# Patient Record
Sex: FEMALE
[De-identification: names, ages, dates, MRNs, and addresses within clinical notes are randomized per-mention and may not be internally consistent; named-entity substitution may affect disease eponyms.]

---

## 2017-08-22 ENCOUNTER — IMPORTED ENCOUNTER (OUTPATIENT)
Age: 82
End: 2017-08-22

## 2017-09-05 ENCOUNTER — IMPORTED ENCOUNTER (OUTPATIENT)
Age: 82
End: 2017-09-05

## 2017-09-18 ENCOUNTER — IMPORTED ENCOUNTER (OUTPATIENT)
Dept: URBAN - METROPOLITAN AREA CLINIC 9 | Facility: CLINIC | Age: 82
End: 2017-09-18

## 2017-09-18 ENCOUNTER — IMPORTED ENCOUNTER (OUTPATIENT)
Age: 82
End: 2017-09-18

## 2017-10-19 ENCOUNTER — IMPORTED ENCOUNTER (OUTPATIENT)
Dept: URBAN - METROPOLITAN AREA CLINIC 9 | Facility: CLINIC | Age: 82
End: 2017-10-19

## 2017-10-19 ENCOUNTER — IMPORTED ENCOUNTER (OUTPATIENT)
Age: 82
End: 2017-10-19

## 2017-10-30 NOTE — PATIENT DISCUSSION
(H10.022) Other mucopurulent conjunctivitis, left eye - Assesment : Examination revealed other mucopurulent conjunctivitis. - Plan : Start Besivance bid OS for 1 week. E-Rx sent to pharmacy. Advised Pt that gtt is milky and can cause some blurred vision so caution should be used. Okay to use in OD if symptoms start there. Call if symptoms worsen, do not improve, or new symptoms or vision changes occur. RTC in 1-2 months for Exam and OCT ONH, sooner if problems or changes.

## 2017-12-05 NOTE — PATIENT DISCUSSION
(H40.013) Open angle with borderline findings, low risk, bilateral - Assesment : Examination revealed suspicion for Open Angle Glaucoma. OCT ONH and IOP stable today. - Plan : Monitor for IOP and NFL changes with visits and testing. RTC in 1 year for Exam and OCT ONH, sooner if problems or changes occur.

## 2017-12-05 NOTE — PATIENT DISCUSSION
(I51.488) Keratoconjunct sicca, not specified as Sjogren's, bilateral - Assesment : Examination revealed Dry Eye Syndrome - Plan : Monitor for changes. ATs recommended daily and prn and hot compresses qam. Refresh coupon given.

## 2018-04-17 NOTE — PATIENT DISCUSSION
(Q88.301) Keratoconjunct sicca, not specified as Sjogren's, bilateral - Assesment : Examination revealed Dry Eye Syndrome - Plan : Monitor for changes. Increase ATs to at least 3-4 times daily and prn. Updated GLRx given today. PT to call with vision changes or problems. RTC as scheduled in December for Exam and JOLY[facility] Vigil 74, sooner if problems.

## 2019-03-01 ENCOUNTER — IMPORTED ENCOUNTER (OUTPATIENT)
Age: 84
End: 2019-03-01

## 2019-03-26 ENCOUNTER — IMPORTED ENCOUNTER (OUTPATIENT)
Dept: URBAN - METROPOLITAN AREA CLINIC 9 | Facility: CLINIC | Age: 84
End: 2019-03-26

## 2019-03-26 ENCOUNTER — IMPORTED ENCOUNTER (OUTPATIENT)
Age: 84
End: 2019-03-26

## 2019-10-28 ENCOUNTER — IMPORTED ENCOUNTER (OUTPATIENT)
Age: 84
End: 2019-10-28

## 2021-01-20 ENCOUNTER — IMPORTED ENCOUNTER (OUTPATIENT)
Dept: URBAN - METROPOLITAN AREA CLINIC 9 | Facility: CLINIC | Age: 86
End: 2021-01-20

## 2021-01-20 ENCOUNTER — IMPORTED ENCOUNTER (OUTPATIENT)
Age: 86
End: 2021-01-20

## 2021-04-21 ENCOUNTER — IMPORTED ENCOUNTER (OUTPATIENT)
Age: 86
End: 2021-04-21

## 2021-04-21 ENCOUNTER — IMPORTED ENCOUNTER (OUTPATIENT)
Dept: URBAN - METROPOLITAN AREA CLINIC 9 | Facility: CLINIC | Age: 86
End: 2021-04-21

## 2021-04-21 PROBLEM — H26.491: Noted: 2021-04-21

## 2021-04-21 PROBLEM — H01.021: Noted: 2021-04-21

## 2021-04-21 PROBLEM — INACTIVE: Noted: 2021-04-21

## 2021-04-21 PROBLEM — H01.024: Noted: 2021-04-21

## 2021-04-21 PROBLEM — H04.123: Noted: 2021-04-21

## 2021-04-21 PROBLEM — H40.013: Noted: 2021-04-21

## 2021-10-02 ASSESSMENT — TONOMETRY
OD_IOP_MMHG: 12
OD_IOP_MMHG: 14
OS_IOP_MMHG: 15
OS_IOP_MMHG: 20
OD_IOP_MMHG: 14
OD_IOP_MMHG: 16
OS_IOP_MMHG: 15
OS_IOP_MMHG: 16
OD_IOP_MMHG: 16
OS_IOP_MMHG: 25

## 2021-10-02 ASSESSMENT — VISUAL ACUITY
OD_CC: 20/20 SN
OS_CC: 20/20 - SN
OS_CC: 20/20 - SN
OS_SC: 20/30 - SN
OS_CC: 20/20 - SN
OS_SC: 20/30 -2 SN
OD_SC: 20/25 -2 SN
OD_CC: 20/20 SN
OD_SC: 20/30 -2 SN
OD_CC: 20/20 SN

## 2021-10-02 ASSESSMENT — KERATOMETRY
OD_AXISANGLE_DEGREES: 135
OD_K2POWER_DIOPTERS: 42
OD_AXISANGLE2_DEGREES: 45
OS_K2POWER_DIOPTERS: 42.25
OD_K1POWER_DIOPTERS: 41.5
OS_K1POWER_DIOPTERS: 42
OS_AXISANGLE_DEGREES: 114
OS_AXISANGLE2_DEGREES: 24

## 2021-11-02 ENCOUNTER — PREPPED CHART (OUTPATIENT)
Dept: URBAN - METROPOLITAN AREA CLINIC 17 | Facility: CLINIC | Age: 86
End: 2021-11-02

## 2021-11-02 ENCOUNTER — ESTABLISHED PATIENT (OUTPATIENT)
Dept: URBAN - METROPOLITAN AREA CLINIC 17 | Facility: CLINIC | Age: 86
End: 2021-11-02

## 2021-11-02 DIAGNOSIS — H04.123: ICD-10-CM

## 2021-11-02 DIAGNOSIS — H40.013: ICD-10-CM

## 2021-11-02 PROCEDURE — 99213 OFFICE O/P EST LOW 20 MIN: CPT

## 2021-11-02 ASSESSMENT — TONOMETRY
OS_IOP_MMHG: 14
OD_IOP_MMHG: 14

## 2021-11-02 ASSESSMENT — VISUAL ACUITY
OD_CC: 20/20-1
OS_CC: 20/25-2

## 2021-11-12 ASSESSMENT — VISUAL ACUITY
OS_CC: 20/20 - SN
OD_CC: 20/20 - SN
OS_CC: 20/30 + SN
OD_CC: 20/20 - SN
OS_SC: 20/30 -2 SN
OS_CC: 20/20 SN
OD_CC: 20/20 - SN
OD_CC: 20/20 - SN
OS_CC: 20/20 - SN
OD_CC: 20/20 SN
OS_CC: 20/20 - SN
OD_CC: 20/20 - SN
OS_CC: 20/20 - SN
OD_CC: 20/20 - SN
OD_CC: 20/20 -2 SN
OS_CC: 20/40 - SN
OS_SC: 20/30 - SN
OD_CC: 20/20 SN
OD_SC: 20/25 -2 SN
OS_CC: 20/20 - SN
OS_CC: 20/20 - SN
OD_SC: 20/30 -2 SN
OD_CC: 20/20 SN
OD_CC: 20/20 SN
OD_CC: 20/30 - SN
OS_CC: 20/20 - SN

## 2021-11-12 ASSESSMENT — TONOMETRY
OD_IOP_MMHG: 14
OS_IOP_MMHG: 16
OS_IOP_MMHG: 20
OS_IOP_MMHG: 25
OS_IOP_MMHG: 15
OD_IOP_MMHG: 16
OD_IOP_MMHG: 19
OD_IOP_MMHG: 14
OS_IOP_MMHG: 15
OD_IOP_MMHG: 16
OD_IOP_MMHG: 12
OD_IOP_MMHG: 15
OS_IOP_MMHG: 15
OD_IOP_MMHG: 16
OS_IOP_MMHG: 20
OS_IOP_MMHG: 16
OS_IOP_MMHG: 17
OS_IOP_MMHG: 16
OD_IOP_MMHG: 14
OD_IOP_MMHG: 14

## 2021-11-12 ASSESSMENT — KERATOMETRY
OD_K2POWER_DIOPTERS: 42
OD_K1POWER_DIOPTERS: 41.5
OD_AXISANGLE2_DEGREES: 45
OD_AXISANGLE_DEGREES: 135
OS_AXISANGLE_DEGREES: 114
OS_K2POWER_DIOPTERS: 42.25
OS_K1POWER_DIOPTERS: 42
OS_AXISANGLE2_DEGREES: 24

## 2022-01-05 ENCOUNTER — ESTABLISHED PATIENT (OUTPATIENT)
Dept: URBAN - METROPOLITAN AREA CLINIC 17 | Facility: CLINIC | Age: 87
End: 2022-01-05

## 2022-01-05 DIAGNOSIS — H40.013: ICD-10-CM

## 2022-01-05 DIAGNOSIS — H01.021: ICD-10-CM

## 2022-01-05 DIAGNOSIS — H26.491: ICD-10-CM

## 2022-01-05 DIAGNOSIS — H35.3130: ICD-10-CM

## 2022-01-05 DIAGNOSIS — H01.024: ICD-10-CM

## 2022-01-05 DIAGNOSIS — H04.123: ICD-10-CM

## 2022-01-05 PROCEDURE — 92133 CPTRZD OPH DX IMG PST SGM ON: CPT

## 2022-01-05 PROCEDURE — 92014 COMPRE OPH EXAM EST PT 1/>: CPT

## 2022-01-05 ASSESSMENT — TONOMETRY
OS_IOP_MMHG: 16
OD_IOP_MMHG: 12

## 2022-01-05 ASSESSMENT — VISUAL ACUITY
OD_CC: 20/20-2
OS_CC: 20/20-2

## 2023-01-18 ENCOUNTER — ESTABLISHED PATIENT (OUTPATIENT)
Dept: URBAN - METROPOLITAN AREA CLINIC 17 | Facility: CLINIC | Age: 88
End: 2023-01-18

## 2023-01-18 DIAGNOSIS — H04.123: ICD-10-CM

## 2023-01-18 DIAGNOSIS — H35.3130: ICD-10-CM

## 2023-01-18 DIAGNOSIS — H40.053: ICD-10-CM

## 2023-01-18 DIAGNOSIS — H26.491: ICD-10-CM

## 2023-01-18 PROCEDURE — 99214 OFFICE O/P EST MOD 30 MIN: CPT

## 2023-01-18 PROCEDURE — 92020 GONIOSCOPY: CPT

## 2023-01-18 PROCEDURE — 92133 CPTRZD OPH DX IMG PST SGM ON: CPT

## 2023-01-18 ASSESSMENT — TONOMETRY
OD_IOP_MMHG: 17
OS_IOP_MMHG: 28
OS_IOP_MMHG: 31

## 2023-01-18 ASSESSMENT — VISUAL ACUITY
OS_CC: 20/20-1
OD_CC: 20/20-2

## 2023-04-11 ENCOUNTER — CLINIC PROCEDURE ONLY (OUTPATIENT)
Dept: URBAN - METROPOLITAN AREA CLINIC 17 | Facility: CLINIC | Age: 88
End: 2023-04-11

## 2023-04-11 DIAGNOSIS — H40.053: ICD-10-CM

## 2023-04-11 PROCEDURE — 65855 TRABECULOPLASTY LASER SURG: CPT

## 2023-04-11 ASSESSMENT — TONOMETRY
OD_IOP_MMHG: 12
OS_IOP_MMHG: 14
OS_IOP_MMHG: 12
OD_IOP_MMHG: 14

## 2023-04-11 ASSESSMENT — VISUAL ACUITY
OD_CC: 20/20
OS_CC: 20/20

## 2023-04-12 ENCOUNTER — POST-OP (OUTPATIENT)
Dept: URBAN - METROPOLITAN AREA CLINIC 17 | Facility: CLINIC | Age: 88
End: 2023-04-12

## 2023-04-12 DIAGNOSIS — H40.053: ICD-10-CM

## 2023-04-12 PROCEDURE — 99024 POSTOP FOLLOW-UP VISIT: CPT

## 2023-04-12 ASSESSMENT — TONOMETRY
OD_IOP_MMHG: 15
OS_IOP_MMHG: 16

## 2023-04-12 ASSESSMENT — VISUAL ACUITY
OU_CC: 20/60
OS_CC: 20/60-1
OD_CC: 20/60-1

## 2023-04-14 ENCOUNTER — POST-OP (OUTPATIENT)
Dept: URBAN - METROPOLITAN AREA CLINIC 17 | Facility: CLINIC | Age: 88
End: 2023-04-14

## 2023-04-14 DIAGNOSIS — S05.02XD: ICD-10-CM

## 2023-04-14 DIAGNOSIS — S05.01XD: ICD-10-CM

## 2023-04-14 DIAGNOSIS — H40.053: ICD-10-CM

## 2023-04-14 PROCEDURE — 99213 OFFICE O/P EST LOW 20 MIN: CPT

## 2023-04-14 ASSESSMENT — VISUAL ACUITY
OD_CC: 20/40+1
OU_CC: 20/30
OS_CC: 20/40+1

## 2023-04-14 ASSESSMENT — TONOMETRY
OD_IOP_MMHG: 12
OS_IOP_MMHG: 12

## 2023-04-18 ENCOUNTER — EMERGENCY VISIT (OUTPATIENT)
Dept: URBAN - METROPOLITAN AREA CLINIC 17 | Facility: CLINIC | Age: 88
End: 2023-04-18

## 2023-04-18 DIAGNOSIS — H57.13: ICD-10-CM

## 2023-04-18 DIAGNOSIS — H40.053: ICD-10-CM

## 2023-04-18 PROCEDURE — 99213 OFFICE O/P EST LOW 20 MIN: CPT

## 2023-04-18 ASSESSMENT — VISUAL ACUITY
OS_CC: 20/40+1
OD_CC: 20/40+1

## 2023-04-18 ASSESSMENT — TONOMETRY
OD_IOP_MMHG: 11
OS_IOP_MMHG: 10

## 2023-04-25 ENCOUNTER — FOLLOW UP (OUTPATIENT)
Dept: URBAN - METROPOLITAN AREA CLINIC 17 | Facility: CLINIC | Age: 88
End: 2023-04-25

## 2023-04-25 DIAGNOSIS — H16.143: ICD-10-CM

## 2023-04-25 DIAGNOSIS — H04.123: ICD-10-CM

## 2023-04-25 DIAGNOSIS — H01.024: ICD-10-CM

## 2023-04-25 DIAGNOSIS — H57.13: ICD-10-CM

## 2023-04-25 DIAGNOSIS — H40.053: ICD-10-CM

## 2023-04-25 DIAGNOSIS — H01.021: ICD-10-CM

## 2023-04-25 PROCEDURE — 99213 OFFICE O/P EST LOW 20 MIN: CPT

## 2023-04-25 ASSESSMENT — VISUAL ACUITY
OU_CC: J5
OS_SC: 20/30+2
OD_SC: 20/60

## 2023-04-25 ASSESSMENT — TONOMETRY
OD_IOP_MMHG: 16
OS_IOP_MMHG: 19

## 2023-05-09 ENCOUNTER — FOLLOW UP (OUTPATIENT)
Dept: URBAN - METROPOLITAN AREA CLINIC 17 | Facility: CLINIC | Age: 88
End: 2023-05-09

## 2023-05-09 DIAGNOSIS — H01.021: ICD-10-CM

## 2023-05-09 DIAGNOSIS — H40.053: ICD-10-CM

## 2023-05-09 DIAGNOSIS — H01.024: ICD-10-CM

## 2023-05-09 PROCEDURE — 99213 OFFICE O/P EST LOW 20 MIN: CPT

## 2023-05-09 ASSESSMENT — VISUAL ACUITY
OD_CC: 20/40
OU_CC: 20/25-2
OS_CC: 20/25-1

## 2023-05-09 ASSESSMENT — TONOMETRY
OS_IOP_MMHG: 18
OD_IOP_MMHG: 16

## 2023-06-12 ENCOUNTER — FOLLOW UP (OUTPATIENT)
Dept: URBAN - METROPOLITAN AREA CLINIC 17 | Facility: CLINIC | Age: 88
End: 2023-06-12

## 2023-06-12 DIAGNOSIS — H40.053: ICD-10-CM

## 2023-06-12 DIAGNOSIS — H26.491: ICD-10-CM

## 2023-06-12 DIAGNOSIS — H01.021: ICD-10-CM

## 2023-06-12 DIAGNOSIS — H01.024: ICD-10-CM

## 2023-06-12 PROCEDURE — 99213 OFFICE O/P EST LOW 20 MIN: CPT

## 2023-06-12 ASSESSMENT — VISUAL ACUITY
OS_CC: 20/30-1
OD_CC: 20/25-2

## 2023-06-12 ASSESSMENT — TONOMETRY
OD_IOP_MMHG: 20
OS_IOP_MMHG: 18

## 2023-08-28 ENCOUNTER — EMERGENCY VISIT (OUTPATIENT)
Dept: URBAN - METROPOLITAN AREA CLINIC 17 | Facility: CLINIC | Age: 88
End: 2023-08-28

## 2023-08-28 DIAGNOSIS — H01.024: ICD-10-CM

## 2023-08-28 DIAGNOSIS — H01.021: ICD-10-CM

## 2023-08-28 DIAGNOSIS — H40.053: ICD-10-CM

## 2023-08-28 DIAGNOSIS — H26.491: ICD-10-CM

## 2023-08-28 PROCEDURE — 92012 INTRM OPH EXAM EST PATIENT: CPT

## 2023-08-28 ASSESSMENT — VISUAL ACUITY
OS_CC: 20/30
OD_CC: 20/25

## 2023-08-28 ASSESSMENT — TONOMETRY
OD_IOP_MMHG: 16
OS_IOP_MMHG: 18

## 2023-09-26 ENCOUNTER — ESTABLISHED PATIENT (OUTPATIENT)
Dept: URBAN - METROPOLITAN AREA CLINIC 17 | Facility: CLINIC | Age: 88
End: 2023-09-26

## 2023-09-26 DIAGNOSIS — H16.143: ICD-10-CM

## 2023-09-26 DIAGNOSIS — H01.024: ICD-10-CM

## 2023-09-26 DIAGNOSIS — H04.123: ICD-10-CM

## 2023-09-26 DIAGNOSIS — H40.053: ICD-10-CM

## 2023-09-26 DIAGNOSIS — H01.021: ICD-10-CM

## 2023-09-26 PROCEDURE — 99213 OFFICE O/P EST LOW 20 MIN: CPT

## 2023-09-26 ASSESSMENT — VISUAL ACUITY
OD_CC: 20/25
OS_CC: 20/25
OU_CC: 20/20

## 2023-09-26 ASSESSMENT — TONOMETRY
OS_IOP_MMHG: 15
OD_IOP_MMHG: 15